# Patient Record
Sex: MALE | Race: WHITE
[De-identification: names, ages, dates, MRNs, and addresses within clinical notes are randomized per-mention and may not be internally consistent; named-entity substitution may affect disease eponyms.]

---

## 2019-06-26 ENCOUNTER — HOSPITAL ENCOUNTER (OUTPATIENT)
Dept: HOSPITAL 84 - D.OPS | Age: 59
Discharge: HOME | End: 2019-06-26
Attending: SURGERY
Payer: COMMERCIAL

## 2019-06-26 VITALS — DIASTOLIC BLOOD PRESSURE: 81 MMHG | SYSTOLIC BLOOD PRESSURE: 145 MMHG

## 2019-06-26 VITALS — HEIGHT: 70 IN | WEIGHT: 186 LBS | BODY MASS INDEX: 26.63 KG/M2 | BODY MASS INDEX: 26.63 KG/M2

## 2019-06-26 DIAGNOSIS — K40.30: Primary | ICD-10-CM

## 2019-06-26 DIAGNOSIS — Z01.812: ICD-10-CM

## 2019-06-26 LAB
ANION GAP SERPL CALC-SCNC: 8.9 MMOL/L (ref 8–16)
BASOPHILS NFR BLD AUTO: 0.9 % (ref 0–2)
BUN SERPL-MCNC: 16 MG/DL (ref 7–18)
CALCIUM SERPL-MCNC: 9.1 MG/DL (ref 8.5–10.1)
CHLORIDE SERPL-SCNC: 105 MMOL/L (ref 98–107)
CO2 SERPL-SCNC: 31.2 MMOL/L (ref 21–32)
CREAT SERPL-MCNC: 0.8 MG/DL (ref 0.6–1.3)
EOSINOPHIL NFR BLD: 5.1 % (ref 0–7)
ERYTHROCYTE [DISTWIDTH] IN BLOOD BY AUTOMATED COUNT: 14.6 % (ref 11.5–14.5)
GLUCOSE SERPL-MCNC: 117 MG/DL (ref 74–106)
HCT VFR BLD CALC: 41.2 % (ref 42–54)
HGB BLD-MCNC: 14.1 G/DL (ref 13.5–17.5)
IMM GRANULOCYTES NFR BLD: 0.3 % (ref 0–5)
LYMPHOCYTES NFR BLD AUTO: 11.3 % (ref 15–50)
MCH RBC QN AUTO: 30.7 PG (ref 26–34)
MCHC RBC AUTO-ENTMCNC: 34.2 G/DL (ref 31–37)
MCV RBC: 89.6 FL (ref 80–100)
MONOCYTES NFR BLD: 11.3 % (ref 2–11)
NEUTROPHILS NFR BLD AUTO: 71.1 % (ref 40–80)
OSMOLALITY SERPL CALC.SUM OF ELEC: 282 MOSM/KG (ref 275–300)
PLATELET # BLD: 245 10X3/UL (ref 130–400)
PMV BLD AUTO: 10.5 FL (ref 7.4–10.4)
POTASSIUM SERPL-SCNC: 4.1 MMOL/L (ref 3.5–5.1)
RBC # BLD AUTO: 4.6 10X6/UL (ref 4.2–6.1)
SODIUM SERPL-SCNC: 141 MMOL/L (ref 136–145)
WBC # BLD AUTO: 6.5 10X3/UL (ref 4.8–10.8)

## 2019-06-28 NOTE — OP
PATIENT NAME:  ARNIE CABRERA                          MEDICAL RECORD: [id number]
:56                                             LOCATION:D.OPS          
                                                         ADMISSION DATE:        
SURGEON:  SURY MARIANO MD            
 
 
DATE OF OPERATION:  2019
 
PREOPERATIVE DIAGNOSIS:  Symptomatic huge left inguinal hernia, incarcerated.
 
POSTOPERATIVE DIAGNOSIS:  Symptomatic huge left indirect inguinal hernia,
incarcerated.
 
PROCEDURES:  Open left indirect inguinal hernia repair with bilayer
preperitoneal polypropylene mesh as well as onlay mesh around the cord
structures, which was also polypropylene.
 
SURGEON:  Sury Mariano MD
 
ASSISTANT:  None.
 
BLOOD LOSS:  100 cc.
 
ANESTHESIA:  General.
 
COMPLICATIONS:  None.
 
The risks, possible complications, and alternatives to the procedure were
explained to the patient.  He elects to proceed.  The discussion specifically
included, but was not limited to, bleeding requiring emergency reoperation; the
probability of a hematoma or a pseudo sac seroma; the possible need for left
orchiectomy; the possible need for debulking of the left scrotum as it is very
stretched.
 
OPERATIVE COURSE:  The patient was conveyed to the operating room electively on
2019.  General anesthesia was induced by the anesthesia staff.  The
abdomen and genitals were sterilely prepped and draped.  A transverse incision
was accomplished in the left groin.  Sharp dissection was carried down through
skin and subcutaneous tissue as well as Sammy's fascia.  Essentially, the
hernia had blown out the left inguinal floor.
 
I mobilized the cord structures from outside the external oblique.  I incised
the hernia sac.  I was able to mobilize all the incarcerated contents which
consisted only of greater omentum.  This was removed utilizing the Super Jaw
EnSeal device.
 
I then reperitonealized the sliding hernia with a pursestring 3-0 Vicryl suture.
 The sliding component was colon.
 
I then reduced the tied off portion of the hernia sac into the preperitoneal
space after excising the redundant sac.
 
I then approached the hernia repair from the preperitoneal approach.  I incised
the external oblique aponeurosis along the direction of its fibers.  I bluntly
dissected down through the internal oblique and transverse abdominis muscle
layers.  A preperitoneal pocket was fashioned bluntly.  Two ovals of
polypropylene were then cut out and sutured one on top of the other with a
running #1 Ethibond.
 
 
 
OPERATIVE REPORT                               [id number]    ARNIE CABRERA        
 
 
 
A mesh was placed in the preperitoneal space.  Once I was satisfied with
placement of the mesh, I sutured the internal oblique and transverse abdominis
muscles together with multiple interrupted horizontal mattress #0 Surgidacs.  At
no time was there any apparent nerve injury during this procedure.
 
I then tightened the external oblique aponeurosis around the cord structures in
order to tighten up the external canal.  I then reapproximated the external
oblique aponeurosis with running #1 Vicryl.  I wanted to place an onlay patch. 
I cut this out and it had 2 tails on one end.  I wrapped the tails around the
cord structures and then sutured the 2 tails together with multiple interrupted
#0 Ethibonds.  I sutured the onlay mesh down to the underlying external oblique
aponeurosis with multiple #1 Vicryls.
 
I documented arterial flow into left testicle by palpation.
 
The Sammy's fascia was reapproximated with interrupted 3-0 Vicryl.  The
subdermis was reapproximated with interrupted 3-0 Vicryl.  The skin was
reapproximated with a running intracuticular 3-0 Vicryl.  Benzoin and
Steri-Strips were applied.
 
The patient was then extubated and conveyed to the postanesthesia care unit,
where he was in stable condition.
 
TRANSINT:[id number] Voice Confirmation ID: [id number] DOCUMENT ID: [id number]
                                           
                                           SURY MARIANO MD            
 
 
 
Electronically Signed by SURY MARIANO on 19 at 1712
 
 
 
 
 
 
 
 
 
 
 
 
 
 
 
 
CC: DALE WYMAN                                           [id number]
DICTATION DATE: 19 1440     :     19 1550      [facility] 
                                                                      [id number] [place] [address]